# Patient Record
Sex: MALE | Race: BLACK OR AFRICAN AMERICAN | Employment: FULL TIME | URBAN - METROPOLITAN AREA
[De-identification: names, ages, dates, MRNs, and addresses within clinical notes are randomized per-mention and may not be internally consistent; named-entity substitution may affect disease eponyms.]

---

## 2017-06-12 ENCOUNTER — APPOINTMENT (OUTPATIENT)
Dept: CT IMAGING | Age: 42
DRG: 439 | End: 2017-06-12
Attending: EMERGENCY MEDICINE
Payer: SELF-PAY

## 2017-06-12 ENCOUNTER — APPOINTMENT (OUTPATIENT)
Dept: GENERAL RADIOLOGY | Age: 42
DRG: 439 | End: 2017-06-12
Attending: EMERGENCY MEDICINE
Payer: SELF-PAY

## 2017-06-12 ENCOUNTER — HOSPITAL ENCOUNTER (INPATIENT)
Age: 42
LOS: 2 days | Discharge: LEFT BEFORE DISCHARGE | DRG: 439 | End: 2017-06-14
Attending: EMERGENCY MEDICINE | Admitting: INTERNAL MEDICINE
Payer: SELF-PAY

## 2017-06-12 DIAGNOSIS — R11.2 NON-INTRACTABLE VOMITING WITH NAUSEA, UNSPECIFIED VOMITING TYPE: ICD-10-CM

## 2017-06-12 DIAGNOSIS — K85.90 ACUTE PANCREATITIS, UNSPECIFIED COMPLICATION STATUS, UNSPECIFIED PANCREATITIS TYPE: Primary | ICD-10-CM

## 2017-06-12 DIAGNOSIS — E86.0 DEHYDRATION: ICD-10-CM

## 2017-06-12 DIAGNOSIS — Z78.9 ALCOHOL USE: ICD-10-CM

## 2017-06-12 PROBLEM — N17.9 AKI (ACUTE KIDNEY INJURY) (HCC): Status: ACTIVE | Noted: 2017-06-12

## 2017-06-12 PROBLEM — R00.0 TACHYCARDIA: Status: ACTIVE | Noted: 2017-06-12

## 2017-06-12 PROBLEM — F10.10 ALCOHOL ABUSE: Status: ACTIVE | Noted: 2017-06-12

## 2017-06-12 PROBLEM — D72.829 LEUKOCYTOSIS: Status: ACTIVE | Noted: 2017-06-12

## 2017-06-12 PROBLEM — K86.3 PANCREATIC PSEUDOCYST: Status: ACTIVE | Noted: 2017-06-12

## 2017-06-12 LAB
ALBUMIN SERPL BCP-MCNC: 4 G/DL (ref 3.4–5)
ALBUMIN/GLOB SERPL: 0.9 {RATIO} (ref 0.8–1.7)
ALP SERPL-CCNC: 47 U/L (ref 45–117)
ALT SERPL-CCNC: 48 U/L (ref 16–61)
ANION GAP BLD CALC-SCNC: 12 MMOL/L (ref 3–18)
APPEARANCE UR: CLEAR
AST SERPL W P-5'-P-CCNC: 40 U/L (ref 15–37)
ATRIAL RATE: 152 BPM
BACTERIA URNS QL MICRO: ABNORMAL /HPF
BASOPHILS # BLD AUTO: 0 K/UL (ref 0–0.06)
BASOPHILS # BLD: 0 % (ref 0–2)
BILIRUB SERPL-MCNC: 2 MG/DL (ref 0.2–1)
BILIRUB UR QL: ABNORMAL
BUN SERPL-MCNC: 8 MG/DL (ref 7–18)
BUN/CREAT SERPL: 5 (ref 12–20)
CALCIUM SERPL-MCNC: 9.8 MG/DL (ref 8.5–10.1)
CALCULATED P AXIS, ECG09: 38 DEGREES
CALCULATED R AXIS, ECG10: 9 DEGREES
CALCULATED T AXIS, ECG11: 31 DEGREES
CHLORIDE SERPL-SCNC: 98 MMOL/L (ref 100–108)
CHOLEST SERPL-MCNC: 192 MG/DL
CO2 SERPL-SCNC: 23 MMOL/L (ref 21–32)
COLOR UR: ABNORMAL
CREAT SERPL-MCNC: 1.46 MG/DL (ref 0.6–1.3)
DIAGNOSIS, 93000: NORMAL
DIFFERENTIAL METHOD BLD: ABNORMAL
EOSINOPHIL # BLD: 0 K/UL (ref 0–0.4)
EOSINOPHIL NFR BLD: 0 % (ref 0–5)
EPITH CASTS URNS QL MICRO: ABNORMAL /LPF (ref 0–5)
ERYTHROCYTE [DISTWIDTH] IN BLOOD BY AUTOMATED COUNT: 13.9 % (ref 11.6–14.5)
GLOBULIN SER CALC-MCNC: 4.7 G/DL (ref 2–4)
GLUCOSE SERPL-MCNC: 110 MG/DL (ref 74–99)
GLUCOSE UR STRIP.AUTO-MCNC: NEGATIVE MG/DL
HCT VFR BLD AUTO: 48.6 % (ref 36–48)
HDLC SERPL-MCNC: 48 MG/DL (ref 40–60)
HDLC SERPL: 4 {RATIO} (ref 0–5)
HGB BLD-MCNC: 16.9 G/DL (ref 13–16)
HGB UR QL STRIP: ABNORMAL
KETONES UR QL STRIP.AUTO: ABNORMAL MG/DL
LDLC SERPL CALC-MCNC: 87.8 MG/DL (ref 0–100)
LEUKOCYTE ESTERASE UR QL STRIP.AUTO: ABNORMAL
LIPASE SERPL-CCNC: 905 U/L (ref 73–393)
LIPID PROFILE,FLP: ABNORMAL
LYMPHOCYTES # BLD AUTO: 7 % (ref 21–52)
LYMPHOCYTES # BLD: 1.1 K/UL (ref 0.9–3.6)
MCH RBC QN AUTO: 33.3 PG (ref 24–34)
MCHC RBC AUTO-ENTMCNC: 34.8 G/DL (ref 31–37)
MCV RBC AUTO: 95.9 FL (ref 74–97)
MIXED CELL CASTS URNS QL MICRO: ABNORMAL /LPF
MONOCYTES # BLD: 0.8 K/UL (ref 0.05–1.2)
MONOCYTES NFR BLD AUTO: 5 % (ref 3–10)
NEUTS SEG # BLD: 14.1 K/UL (ref 1.8–8)
NEUTS SEG NFR BLD AUTO: 88 % (ref 40–73)
NITRITE UR QL STRIP.AUTO: POSITIVE
P-R INTERVAL, ECG05: 122 MS
PH UR STRIP: 5.5 [PH] (ref 5–8)
PLATELET # BLD AUTO: 226 K/UL (ref 135–420)
PMV BLD AUTO: 10.9 FL (ref 9.2–11.8)
POTASSIUM SERPL-SCNC: 3.8 MMOL/L (ref 3.5–5.5)
PROT SERPL-MCNC: 8.7 G/DL (ref 6.4–8.2)
PROT UR STRIP-MCNC: 300 MG/DL
Q-T INTERVAL, ECG07: 328 MS
QRS DURATION, ECG06: 70 MS
QTC CALCULATION (BEZET), ECG08: 521 MS
RBC # BLD AUTO: 5.07 M/UL (ref 4.7–5.5)
RBC #/AREA URNS HPF: ABNORMAL /HPF (ref 0–5)
SODIUM SERPL-SCNC: 133 MMOL/L (ref 136–145)
SP GR UR REFRACTOMETRY: >1.03 (ref 1–1.03)
TRIGL SERPL-MCNC: 281 MG/DL (ref ?–150)
UROBILINOGEN UR QL STRIP.AUTO: 1 EU/DL (ref 0.2–1)
VENTRICULAR RATE, ECG03: 152 BPM
VLDLC SERPL CALC-MCNC: 56.2 MG/DL
WBC # BLD AUTO: 16.1 K/UL (ref 4.6–13.2)
WBC URNS QL MICRO: ABNORMAL /HPF (ref 0–4)

## 2017-06-12 PROCEDURE — 74011000250 HC RX REV CODE- 250: Performed by: EMERGENCY MEDICINE

## 2017-06-12 PROCEDURE — C9113 INJ PANTOPRAZOLE SODIUM, VIA: HCPCS | Performed by: EMERGENCY MEDICINE

## 2017-06-12 PROCEDURE — 87040 BLOOD CULTURE FOR BACTERIA: CPT | Performed by: HOSPITALIST

## 2017-06-12 PROCEDURE — 80061 LIPID PANEL: CPT | Performed by: INTERNAL MEDICINE

## 2017-06-12 PROCEDURE — 83605 ASSAY OF LACTIC ACID: CPT | Performed by: INTERNAL MEDICINE

## 2017-06-12 PROCEDURE — 74011250637 HC RX REV CODE- 250/637: Performed by: HOSPITALIST

## 2017-06-12 PROCEDURE — 74011250636 HC RX REV CODE- 250/636: Performed by: INTERNAL MEDICINE

## 2017-06-12 PROCEDURE — 96361 HYDRATE IV INFUSION ADD-ON: CPT

## 2017-06-12 PROCEDURE — 81001 URINALYSIS AUTO W/SCOPE: CPT | Performed by: EMERGENCY MEDICINE

## 2017-06-12 PROCEDURE — 96375 TX/PRO/DX INJ NEW DRUG ADDON: CPT

## 2017-06-12 PROCEDURE — 96374 THER/PROPH/DIAG INJ IV PUSH: CPT

## 2017-06-12 PROCEDURE — 74011636320 HC RX REV CODE- 636/320: Performed by: EMERGENCY MEDICINE

## 2017-06-12 PROCEDURE — 85025 COMPLETE CBC W/AUTO DIFF WBC: CPT | Performed by: EMERGENCY MEDICINE

## 2017-06-12 PROCEDURE — 83690 ASSAY OF LIPASE: CPT | Performed by: EMERGENCY MEDICINE

## 2017-06-12 PROCEDURE — 74011250636 HC RX REV CODE- 250/636: Performed by: HOSPITALIST

## 2017-06-12 PROCEDURE — 74011250636 HC RX REV CODE- 250/636: Performed by: EMERGENCY MEDICINE

## 2017-06-12 PROCEDURE — 65660000000 HC RM CCU STEPDOWN

## 2017-06-12 PROCEDURE — 36415 COLL VENOUS BLD VENIPUNCTURE: CPT | Performed by: INTERNAL MEDICINE

## 2017-06-12 PROCEDURE — 99285 EMERGENCY DEPT VISIT HI MDM: CPT

## 2017-06-12 PROCEDURE — 93005 ELECTROCARDIOGRAM TRACING: CPT

## 2017-06-12 PROCEDURE — 77030020263 HC SOL INJ SOD CL0.9% LFCR 1000ML

## 2017-06-12 PROCEDURE — 74177 CT ABD & PELVIS W/CONTRAST: CPT

## 2017-06-12 PROCEDURE — 80053 COMPREHEN METABOLIC PANEL: CPT | Performed by: EMERGENCY MEDICINE

## 2017-06-12 PROCEDURE — 87086 URINE CULTURE/COLONY COUNT: CPT | Performed by: HOSPITALIST

## 2017-06-12 PROCEDURE — 71010 XR CHEST SNGL V: CPT

## 2017-06-12 RX ORDER — ACETAMINOPHEN 325 MG/1
650 TABLET ORAL
Status: DISCONTINUED | OUTPATIENT
Start: 2017-06-12 | End: 2017-06-14 | Stop reason: HOSPADM

## 2017-06-12 RX ORDER — SODIUM CHLORIDE 0.9 % (FLUSH) 0.9 %
5-10 SYRINGE (ML) INJECTION AS NEEDED
Status: DISCONTINUED | OUTPATIENT
Start: 2017-06-12 | End: 2017-06-13

## 2017-06-12 RX ORDER — FAMOTIDINE 10 MG/ML
20 INJECTION INTRAVENOUS
Status: COMPLETED | OUTPATIENT
Start: 2017-06-12 | End: 2017-06-12

## 2017-06-12 RX ORDER — SODIUM CHLORIDE 9 MG/ML
250 INJECTION, SOLUTION INTRAVENOUS ONCE
Status: COMPLETED | OUTPATIENT
Start: 2017-06-13 | End: 2017-06-13

## 2017-06-12 RX ORDER — ONDANSETRON 2 MG/ML
4 INJECTION INTRAMUSCULAR; INTRAVENOUS
Status: COMPLETED | OUTPATIENT
Start: 2017-06-12 | End: 2017-06-12

## 2017-06-12 RX ORDER — PANTOPRAZOLE SODIUM 40 MG/10ML
80 INJECTION, POWDER, LYOPHILIZED, FOR SOLUTION INTRAVENOUS
Status: COMPLETED | OUTPATIENT
Start: 2017-06-12 | End: 2017-06-12

## 2017-06-12 RX ORDER — SODIUM CHLORIDE 9 MG/ML
100 INJECTION, SOLUTION INTRAVENOUS CONTINUOUS
Status: DISCONTINUED | OUTPATIENT
Start: 2017-06-12 | End: 2017-06-14 | Stop reason: HOSPADM

## 2017-06-12 RX ORDER — MORPHINE SULFATE 4 MG/ML
4 INJECTION, SOLUTION INTRAMUSCULAR; INTRAVENOUS
Status: COMPLETED | OUTPATIENT
Start: 2017-06-12 | End: 2017-06-12

## 2017-06-12 RX ORDER — ENOXAPARIN SODIUM 100 MG/ML
40 INJECTION SUBCUTANEOUS EVERY 24 HOURS
Status: DISCONTINUED | OUTPATIENT
Start: 2017-06-12 | End: 2017-06-14 | Stop reason: HOSPADM

## 2017-06-12 RX ORDER — HYDROMORPHONE HYDROCHLORIDE 1 MG/ML
1 INJECTION, SOLUTION INTRAMUSCULAR; INTRAVENOUS; SUBCUTANEOUS
Status: DISCONTINUED | OUTPATIENT
Start: 2017-06-12 | End: 2017-06-14 | Stop reason: HOSPADM

## 2017-06-12 RX ORDER — HYDROMORPHONE HYDROCHLORIDE 2 MG/ML
1 INJECTION, SOLUTION INTRAMUSCULAR; INTRAVENOUS; SUBCUTANEOUS
Status: DISCONTINUED | OUTPATIENT
Start: 2017-06-12 | End: 2017-06-12

## 2017-06-12 RX ORDER — ONDANSETRON 2 MG/ML
4 INJECTION INTRAMUSCULAR; INTRAVENOUS
Status: DISCONTINUED | OUTPATIENT
Start: 2017-06-12 | End: 2017-06-14 | Stop reason: HOSPADM

## 2017-06-12 RX ORDER — SODIUM CHLORIDE 0.9 % (FLUSH) 0.9 %
5-10 SYRINGE (ML) INJECTION EVERY 8 HOURS
Status: DISCONTINUED | OUTPATIENT
Start: 2017-06-12 | End: 2017-06-13

## 2017-06-12 RX ADMIN — Medication 4 MG: at 11:44

## 2017-06-12 RX ADMIN — Medication 10 ML: at 21:13

## 2017-06-12 RX ADMIN — SODIUM CHLORIDE 1000 ML: 900 INJECTION, SOLUTION INTRAVENOUS at 10:07

## 2017-06-12 RX ADMIN — ENOXAPARIN SODIUM 40 MG: 40 INJECTION SUBCUTANEOUS at 17:11

## 2017-06-12 RX ADMIN — PANTOPRAZOLE SODIUM 80 MG: 40 INJECTION, POWDER, FOR SOLUTION INTRAVENOUS at 11:44

## 2017-06-12 RX ADMIN — SODIUM CHLORIDE 250 ML: 900 INJECTION, SOLUTION INTRAVENOUS at 23:12

## 2017-06-12 RX ADMIN — ONDANSETRON 4 MG: 2 INJECTION INTRAMUSCULAR; INTRAVENOUS at 11:44

## 2017-06-12 RX ADMIN — SODIUM CHLORIDE 1000 ML: 900 INJECTION, SOLUTION INTRAVENOUS at 10:59

## 2017-06-12 RX ADMIN — Medication 10 ML: at 17:00

## 2017-06-12 RX ADMIN — ACETAMINOPHEN 650 MG: 325 TABLET, FILM COATED ORAL at 23:11

## 2017-06-12 RX ADMIN — SODIUM CHLORIDE 100 ML/HR: 900 INJECTION, SOLUTION INTRAVENOUS at 23:31

## 2017-06-12 RX ADMIN — HYDROMORPHONE HYDROCHLORIDE 1 MG: 1 INJECTION, SOLUTION INTRAMUSCULAR; INTRAVENOUS; SUBCUTANEOUS at 17:14

## 2017-06-12 RX ADMIN — SODIUM CHLORIDE 100 ML/HR: 900 INJECTION, SOLUTION INTRAVENOUS at 17:00

## 2017-06-12 RX ADMIN — IOPAMIDOL 99 ML: 612 INJECTION, SOLUTION INTRAVENOUS at 11:01

## 2017-06-12 RX ADMIN — HYDROMORPHONE HYDROCHLORIDE 1 MG: 1 INJECTION, SOLUTION INTRAMUSCULAR; INTRAVENOUS; SUBCUTANEOUS at 21:13

## 2017-06-12 RX ADMIN — FAMOTIDINE 20 MG: 10 INJECTION, SOLUTION INTRAVENOUS at 12:14

## 2017-06-12 NOTE — ED TRIAGE NOTES
Onset of abdominal pain Sat, vomiting, able to keep liquids down, \"early this AM chest pain\", diaphoretic

## 2017-06-12 NOTE — PROGRESS NOTES
conducted an initial consultation and Spiritual Assessment for Raomnita, who is a 37 y.o.,male. Patients Primary Language is: [place]. According to the patients EMR Buddhism Affiliation is: No Gnosticist. The reason the Patient came to the hospital is:   Patient Active Problem List    Diagnosis Date Noted    Acute pancreatitis 06/12/2017    Pancreatic pseudocyst 06/12/2017    Leukocytosis 06/12/2017    Tachycardia 06/12/2017    Dehydration 06/12/2017    DEMETRIA (acute kidney injury) ([facility] [address] 06/12/2017    Alcohol abuse 06/12/2017        The  provided the following Interventions:  Initiated a relationship of care and support. Explored issues of vaishnavi, belief, spirituality and Bahai/ritual needs while hospitalized. Listened empathically. Provided information about Spiritual Care Services. Offered prayer and assurance of continued prayers on patient's behalf. Chart reviewed. The following outcomes were achieved:  Patient shared limited information about both their medical narrative and spiritual journey/beliefs. Patient processed feeling about current hospitalization. Patient expressed gratitude for 's visit. Assessment:  Patient does not have any Bahai/cultural needs that will affect patients preferences in health care. There are no further spiritual or Bahai issues which require intervention at this time. Plan:  Chaplains will continue to follow and will provide pastoral care on an as needed/requested basis.  recommends bedside caregivers page  on duty if patient shows signs of acute spiritual or emotional distress. Karine Cox M.Div.   [facility]  [phone]

## 2017-06-12 NOTE — PROGRESS NOTES
Pt. Care received. Resting comfortably in bed. A/O 4. Call light within reach and bed in lowest position and locked. Patient stable.

## 2017-06-12 NOTE — H&P
Hospitalist Admission Note    NAME:  Deneen Rodriguez   :   1975   MRN:   [id number]     Date/Time:  2017 5:24 PM    Subjective:     CHIEF COMPLAINT:    Chief Complaint   Patient presents with    Abdominal Pain    Vomiting       HISTORY OF PRESENT ILLNESS:     Aldo Mattson is a 37 y.o.  male with h/o alcohol abuse,came to the hospital because of abdominal pain,nausea,vomting. It is more epigastric pain but also involving mid-section of abdomen. Patient says that he ate some fish and was drinking alcohol the same day. The next he started having the abdominal pain,nausea and has vomited twice and thought that it was foods poisoning. He denies fever or chills. As his symptoms did not improve. he decided to come to the emergency room. In ER,lab showed lipase 905. TC abdomen c/w acute pancreatitis. He was started on pain medications and iv fluid. I was then called for admission. History reviewed. No pertinent past medical history. Social History   Substance Use Topics    Smoking status: Current Some Day Smoker     Packs/day: 0.50    Smokeless tobacco: Never Used    Alcohol use 0.0 oz/week     0 Standard drinks or equivalent per week        History reviewed. No pertinent family history. No Known Allergies     Prior to Admission medications    Medication Sig Start Date End Date Taking? Authorizing Provider   HYDROcodone-acetaminophen (NORCO) 5-325 mg per tablet Take 1-2 tablets PO every 4-6 hours as needed for pain control. If over the counter ibuprofen or acetaminophen was suggested, then only take the vicodin for pain not well controlled with the over the counter medication. 7/11/15   Lucila , DO       REVIEW OF SYSTEMS:    Constitutional:  No fever or weight loss  HEENT:  No headache or visual changes  Cardiovascular:  No chest pain, no palpitations. Respiratory:  No coughing, wheezing, or shortness of breath. GI:  Adominal pain. Nausea and vomiting.   No diarrhea  :  No hematuria or dysuria. No frequency, retention, urinary incontinence. Skin:  No rashes or moles  Neuro:  No seizures or syncope  Hematological:  No bruising or bleeding  Endocrine:  No diabetes or thyroid disease  Objective:   VITALS:       Visit Vitals    BP (!) 154/113 (BP 1 Location: Right arm, BP Patient Position: At rest;Sitting)    Pulse (!) 130    Temp 99.1 °F (37.3 °C)    Resp 20    Ht 5' 7\" (1.702 m)    Wt 95.3 kg (210 lb)    SpO2 96%    BMI 32.89 kg/m2       O2 Device: Room air    PHYSICAL EXAM:   General:    Lying in bed in no acute distress. HEENT:  Pupils equal.  Sclera anicteric. Conjunctiva pink. Mucous membranes                           moist  Neck:  Supple. Trachea midline. No accessory muscle use. No thyromegaly. No jugular venous distention  CV:                  Regular rate and rhythm. Lungs:   Clear to auscultation bilaterally. No Wheezing or Rhonchi. No rales. Normal to percussion  Abdomen:   Diffuse tenderness to palpation,no rebound or guarding. Extremities: No cyanosis. No edema. No clubbing  Neurologic: Alert and oriented X 3. Skin:                Warm and dry. No rashes.        LAB DATA REVIEWED:    Recent Results (from the past 24 hour(s))   EKG, 12 LEAD, INITIAL    Collection Time: 06/12/17  8:03 AM   Result Value Ref Range    Ventricular Rate 152 BPM    Atrial Rate 152 BPM    P-R Interval 122 ms    QRS Duration 70 ms    Q-T Interval 328 ms    QTC Calculation (Bezet) 521 ms    Calculated P Axis 38 degrees    Calculated R Axis 9 degrees    Calculated T Axis 31 degrees    Diagnosis       Sinus tachycardia  Minimal voltage criteria for LVH, may be normal variant  Borderline ECG  No previous ECGs available  Confirmed by Doreen Reyna ([id number]) on 6/12/2017 11:42:48 AM     CBC WITH AUTOMATED DIFF    Collection Time: 06/12/17  8:39 AM   Result Value Ref Range    WBC 16.1 (H) 4.6 - 13.2 K/uL    RBC 5.07 4.70 - 5.50 M/uL    HGB 16.9 (H) 13.0 - 16.0 g/dL    HCT 48.6 (H) 36.0 - 48.0 %    MCV 95.9 74.0 - 97.0 FL    MCH 33.3 24.0 - 34.0 PG    MCHC 34.8 31.0 - 37.0 g/dL    RDW 13.9 11.6 - 14.5 %    PLATELET 640 201 - 835 K/uL    MPV 10.9 9.2 - 11.8 FL    NEUTROPHILS 88 (H) 40 - 73 %    LYMPHOCYTES 7 (L) 21 - 52 %    MONOCYTES 5 3 - 10 %    EOSINOPHILS 0 0 - 5 %    BASOPHILS 0 0 - 2 %    ABS. NEUTROPHILS 14.1 (H) 1.8 - 8.0 K/UL    ABS. LYMPHOCYTES 1.1 0.9 - 3.6 K/UL    ABS. MONOCYTES 0.8 0.05 - 1.2 K/UL    ABS. EOSINOPHILS 0.0 0.0 - 0.4 K/UL    ABS. BASOPHILS 0.0 0.0 - 0.06 K/UL    DF AUTOMATED     LIPASE    Collection Time: 06/12/17  8:39 AM   Result Value Ref Range    Lipase 905 (H) 73 - 788 U/L   METABOLIC PANEL, COMPREHENSIVE    Collection Time: 06/12/17  8:39 AM   Result Value Ref Range    Sodium 133 (L) 136 - 145 mmol/L    Potassium 3.8 3.5 - 5.5 mmol/L    Chloride 98 (L) 100 - 108 mmol/L    CO2 23 21 - 32 mmol/L    Anion gap 12 3.0 - 18 mmol/L    Glucose 110 (H) 74 - 99 mg/dL    BUN 8 7.0 - 18 MG/DL    Creatinine 1.46 (H) 0.6 - 1.3 MG/DL    BUN/Creatinine ratio 5 (L) 12 - 20      GFR est AA >60 >60 ml/min/1.73m2    GFR est non-AA 53 (L) >60 ml/min/1.73m2    Calcium 9.8 8.5 - 10.1 MG/DL    Bilirubin, total 2.0 (H) 0.2 - 1.0 MG/DL    ALT (SGPT) 48 16 - 61 U/L    AST (SGOT) 40 (H) 15 - 37 U/L    Alk.  phosphatase 47 45 - 117 U/L    Protein, total 8.7 (H) 6.4 - 8.2 g/dL    Albumin 4.0 3.4 - 5.0 g/dL    Globulin 4.7 (H) 2.0 - 4.0 g/dL    A-G Ratio 0.9 0.8 - 1.7     URINALYSIS W/ RFLX MICROSCOPIC    Collection Time: 06/12/17 11:25 AM   Result Value Ref Range    Color ORANGE      Appearance CLEAR      Specific gravity >1.030 (H) 1.005 - 1.030    pH (UA) 5.5 5.0 - 8.0      Protein 300 (A) NEG mg/dL    Glucose NEGATIVE  NEG mg/dL    Ketone TRACE (A) NEG mg/dL    Bilirubin SMALL (A) NEG      Blood MODERATE (A) NEG      Urobilinogen 1.0 0.2 - 1.0 EU/dL    Nitrites POSITIVE (A) NEG      Leukocyte Esterase TRACE (A) NEG     URINE MICROSCOPIC ONLY    Collection Time: 06/12/17 11:25 AM   Result Value Ref Range    WBC 0 to 3 0 - 4 /hpf    RBC 4 to 10 0 - 5 /hpf    Epithelial cells FEW 0 - 5 /lpf    Bacteria 1+ (A) NEG /hpf    Mixed Cell Cast 0 to 3 NEG /LPF       Assessment/Plan:      Principal Problem:    Acute pancreatitis (6/12/2017)    Active Problems:    Pancreatic pseudocyst (6/12/2017)      Leukocytosis (6/12/2017)      Tachycardia (6/12/2017)      Dehydration (6/12/2017)      DEMETRIA (acute kidney injury) ([facility] [address] (6/12/2017)      Alcohol abuse (6/12/2017)      Hyponatremia  ___________________________________________________  PLAN:    1. Acute pancreatitis:    -IV fluid. Dialudid for pain and zofran for nausea. -Will repeat CT of abd. 2.Dehydration/DEMETRIA/Hyponatremia/Tachycardia    -On iv fluid. Will monitor kidney functions and electrolytes  3. Alcohol abuse   -CWA protocol   Thiamine,folate and MVI  4.Leukocytosis   -Will follow up.likely due to pancreatitis.   DVT prophylaxis:lovenox  Full code  ___________________________________________________  Admitting Physician: Byron Gonzalez MD

## 2017-06-12 NOTE — ED PROVIDER NOTES
HPI Comments: 8:11 AM Singh Cheung is a 37 y.o. male with no pertinent medical history who presents to the emergency department c/o abdominal pain onset 2 days ago. The patient explains he believes he ate undercooked fish on Friday, and notes drinking alcohol the same day. Pt states he woke up the next day with constant abd pain. Pt pain radiates to his chest, and is worse or better as he sits. Pt also c/o nausea and vomiting. Pt denies fever, chills, diarrhea, and drug usage. No other concerns at this time. PCP: None)    The history is provided by the patient. No past medical history on file. No past surgical history on file. No family history on file. Social History     Social History    Marital status: SINGLE     Spouse name: N/A    Number of children: N/A    Years of education: N/A     Occupational History    Not on file. Social History Main Topics    Smoking status: Current Some Day Smoker     Packs/day: 0.50    Smokeless tobacco: Never Used    Alcohol use No    Drug use: No    Sexual activity: Not on file     Other Topics Concern    Not on file     Social History Narrative    ** Merged History Encounter **              ALLERGIES: Review of patient's allergies indicates no known allergies. Review of Systems   Constitutional: Negative for chills, fatigue, fever and unexpected weight change. HENT: Negative for congestion and rhinorrhea. Respiratory: Negative for chest tightness and shortness of breath. Cardiovascular: Positive for chest pain. Negative for palpitations and leg swelling. Gastrointestinal: Positive for abdominal pain, nausea and vomiting. Negative for diarrhea. Genitourinary: Negative for dysuria. Musculoskeletal: Negative for back pain. Skin: Negative for rash. Neurological: Negative for dizziness and weakness. Psychiatric/Behavioral: The patient is not nervous/anxious.         Vitals:    06/12/17 1000 06/12/17 1015 06/12/17 1030 06/12/17 1045   BP: (!) 159/103 (!) 146/102 134/89 (!) 138/107   Pulse: (!) 122 (!) 122 (!) 130 (!) 130   Resp: 21 23 22 20   Temp:       SpO2: 97% 96% 96% 95%   Weight:       Height:                Physical Exam   Constitutional: He is oriented to person, place, and time. He appears well-developed and well-nourished. No distress. HENT:   Head: Normocephalic and atraumatic. Right Ear: External ear normal.   Left Ear: External ear normal.   Nose: Nose normal.   Dry mucus membranes   Eyes: Conjunctivae and EOM are normal. Pupils are equal, round, and reactive to light. No scleral icterus. Neck: Normal range of motion. Neck supple. No JVD present. No tracheal deviation present. No thyromegaly present. Cardiovascular: Regular rhythm, normal heart sounds and intact distal pulses. Tachycardia present. Exam reveals no gallop and no friction rub. No murmur heard. Pulmonary/Chest: Effort normal and breath sounds normal. He exhibits no tenderness. Abdominal: Soft. Bowel sounds are normal. He exhibits no distension. There is tenderness. There is no rebound and no guarding. L flank/epigastric/upper abd tenderness. Bilateral lower quadrant tenderness   Musculoskeletal: Normal range of motion. He exhibits no edema or tenderness. Lymphadenopathy:     He has no cervical adenopathy. Neurological: He is alert and oriented to person, place, and time. No cranial nerve deficit. Coordination normal.   No sensory loss, Gait normal, Motor 5/5   Skin: Skin is warm. He is diaphoretic. Psychiatric: He has a normal mood and affect. His behavior is normal. Judgment and thought content normal.   Nursing note and vitals reviewed.        MDM  Number of Diagnoses or Management Options  Acute pancreatitis, unspecified complication status, unspecified pancreatitis type:   Alcohol use:   Dehydration:   Non-intractable vomiting with nausea, unspecified vomiting type:   Diagnosis management comments: Pt with abd pain, vomiting, and tachycardia 2 days of symptoms. Concerned for gallbladder disease vs pancreatitis. Will CT for obstructive pathology vs hemorraghic pancreatitis. Critical Care  Total time providing critical care: 30-74 minutes    ED Course       Procedures      Vitals:  Patient Vitals for the past 12 hrs:   Temp Pulse Resp BP SpO2   06/12/17 1045 - (!) 130 20 (!) 138/107 95 %   06/12/17 1030 - (!) 130 22 134/89 96 %   06/12/17 1015 - (!) 122 23 (!) 146/102 96 %   06/12/17 1000 - (!) 122 21 (!) 159/103 97 %   06/12/17 0945 - (!) 122 23 (!) 152/99 97 %   06/12/17 0930 - (!) 124 24 (!) 145/103 96 %   06/12/17 0915 - (!) 120 23 (!) 154/100 96 %   06/12/17 0900 - (!) 122 21 (!) 151/104 97 %   06/12/17 0845 - (!) 127 21 (!) 185/91 98 %   06/12/17 0830 - (!) 131 - (!) 138/95 97 %   06/12/17 0818 98.9 °F (37.2 °C) (!) 134 - (!) 142/101 97 %   06/12/17 0803 - - - (!) 158/111 97 %   06/12/17 0758 98.5 °F (36.9 °C) (!) 154 18 (!) 145/109 97 %           EKG interpretation by ED Physician:  Sinus rhythm 152 bpm, normal axis, no flutter wave appreciated, LVH is present. Qt/QTc widen 5.21ms. X-Ray, CT or other radiology findings or impressions:  CT ABD PELV W CONT   Final Result   IMPRESSION:     Prominent phlegmon adjacent to the pancreatic tail with ill-defined cystic mass  most likely due to an acute pancreatitis with developing pseudocyst. Follow-up  studies would be helpful to exclude a developing abscess. XR CHEST SNGL V   Final Result   IMPRESSION:     Hazy bibasilar lung abnormality -- differential diagnosis includes predominantly  infection, aspiration, and atelectasis           Progress notes, Consult notes or additional Procedure notes:   11:37 AM Pt labs consistent with acute pancreatitis, CT scan consistent with acute pancreatitis. No findings of hemorraghic pancreatitis. Discussed with patient, agrees to admission. 11:41 AM Consult:  Discussed care with Dr. Collette Shelton agrees to managing patient.  Standard discussion; including history of patients chief complaint, available diagnostic results, and treatment course. Disposition:  Diagnosis:   1. Acute pancreatitis, unspecified complication status, unspecified pancreatitis type    2. Dehydration    3. Non-intractable vomiting with nausea, unspecified vomiting type    4. Alcohol use        Disposition: Admitted         [facility] for and in presence of Opal Ashby MD (06/12/17)      Physician Attestation  I personally preformed the services described in this documentation, reviewed and edited the documentation which was dictated to the scribe in my presence, and it accurately records my words and actions.      Opal Ashby MD (06/12/17)      Signed by: Gretel Arce, 06/12/17, 8:11 AM

## 2017-06-13 LAB
AMYLASE SERPL-CCNC: 79 U/L (ref 25–115)
ANION GAP BLD CALC-SCNC: 10 MMOL/L (ref 3–18)
BASOPHILS # BLD AUTO: 0 K/UL (ref 0–0.06)
BASOPHILS # BLD: 0 % (ref 0–2)
BUN SERPL-MCNC: 11 MG/DL (ref 7–18)
BUN/CREAT SERPL: 8 (ref 12–20)
CALCIUM SERPL-MCNC: 8.9 MG/DL (ref 8.5–10.1)
CHLORIDE SERPL-SCNC: 103 MMOL/L (ref 100–108)
CO2 SERPL-SCNC: 26 MMOL/L (ref 21–32)
CREAT SERPL-MCNC: 1.42 MG/DL (ref 0.6–1.3)
DIFFERENTIAL METHOD BLD: ABNORMAL
EOSINOPHIL # BLD: 0.1 K/UL (ref 0–0.4)
EOSINOPHIL NFR BLD: 0 % (ref 0–5)
ERYTHROCYTE [DISTWIDTH] IN BLOOD BY AUTOMATED COUNT: 14.5 % (ref 11.6–14.5)
GLUCOSE SERPL-MCNC: 64 MG/DL (ref 74–99)
HCT VFR BLD AUTO: 41.7 % (ref 36–48)
HGB BLD-MCNC: 13.9 G/DL (ref 13–16)
LACTATE SERPL-SCNC: 1.1 MMOL/L (ref 0.4–2)
LIPASE SERPL-CCNC: 504 U/L (ref 73–393)
LYMPHOCYTES # BLD AUTO: 15 % (ref 21–52)
LYMPHOCYTES # BLD: 1.9 K/UL (ref 0.9–3.6)
MCH RBC QN AUTO: 32.9 PG (ref 24–34)
MCHC RBC AUTO-ENTMCNC: 33.3 G/DL (ref 31–37)
MCV RBC AUTO: 98.6 FL (ref 74–97)
MONOCYTES # BLD: 0.9 K/UL (ref 0.05–1.2)
MONOCYTES NFR BLD AUTO: 7 % (ref 3–10)
NEUTS SEG # BLD: 9.8 K/UL (ref 1.8–8)
NEUTS SEG NFR BLD AUTO: 78 % (ref 40–73)
PLATELET # BLD AUTO: 202 K/UL (ref 135–420)
PMV BLD AUTO: 10.6 FL (ref 9.2–11.8)
POTASSIUM SERPL-SCNC: 4.2 MMOL/L (ref 3.5–5.5)
RBC # BLD AUTO: 4.23 M/UL (ref 4.7–5.5)
SODIUM SERPL-SCNC: 139 MMOL/L (ref 136–145)
WBC # BLD AUTO: 12.6 K/UL (ref 4.6–13.2)

## 2017-06-13 PROCEDURE — 85025 COMPLETE CBC W/AUTO DIFF WBC: CPT | Performed by: INTERNAL MEDICINE

## 2017-06-13 PROCEDURE — 74011250636 HC RX REV CODE- 250/636: Performed by: INTERNAL MEDICINE

## 2017-06-13 PROCEDURE — 65660000000 HC RM CCU STEPDOWN

## 2017-06-13 PROCEDURE — 80048 BASIC METABOLIC PNL TOTAL CA: CPT | Performed by: INTERNAL MEDICINE

## 2017-06-13 PROCEDURE — 77030020263 HC SOL INJ SOD CL0.9% LFCR 1000ML

## 2017-06-13 PROCEDURE — 74011250637 HC RX REV CODE- 250/637: Performed by: INTERNAL MEDICINE

## 2017-06-13 PROCEDURE — 83690 ASSAY OF LIPASE: CPT | Performed by: INTERNAL MEDICINE

## 2017-06-13 PROCEDURE — 74011250636 HC RX REV CODE- 250/636: Performed by: HOSPITALIST

## 2017-06-13 PROCEDURE — 74011250637 HC RX REV CODE- 250/637: Performed by: HOSPITALIST

## 2017-06-13 PROCEDURE — 74011000258 HC RX REV CODE- 258: Performed by: HOSPITALIST

## 2017-06-13 PROCEDURE — 36415 COLL VENOUS BLD VENIPUNCTURE: CPT | Performed by: INTERNAL MEDICINE

## 2017-06-13 PROCEDURE — 82150 ASSAY OF AMYLASE: CPT | Performed by: INTERNAL MEDICINE

## 2017-06-13 RX ORDER — THERA TABS 400 MCG
1 TAB ORAL DAILY
Status: DISCONTINUED | OUTPATIENT
Start: 2017-06-14 | End: 2017-06-14 | Stop reason: HOSPADM

## 2017-06-13 RX ORDER — FOLIC ACID 1 MG/1
1 TABLET ORAL DAILY
Status: DISCONTINUED | OUTPATIENT
Start: 2017-06-14 | End: 2017-06-14 | Stop reason: HOSPADM

## 2017-06-13 RX ORDER — ASPIRIN 325 MG/1
100 TABLET, FILM COATED ORAL DAILY
Status: DISCONTINUED | OUTPATIENT
Start: 2017-06-14 | End: 2017-06-14 | Stop reason: HOSPADM

## 2017-06-13 RX ORDER — IBUPROFEN 200 MG
CAPSULE ORAL AS NEEDED
COMMUNITY

## 2017-06-13 RX ORDER — NICOTINE 7MG/24HR
1 PATCH, TRANSDERMAL 24 HOURS TRANSDERMAL DAILY
Status: DISCONTINUED | OUTPATIENT
Start: 2017-06-13 | End: 2017-06-14 | Stop reason: HOSPADM

## 2017-06-13 RX ADMIN — ENOXAPARIN SODIUM 40 MG: 40 INJECTION SUBCUTANEOUS at 17:38

## 2017-06-13 RX ADMIN — HYDROMORPHONE HYDROCHLORIDE 1 MG: 1 INJECTION, SOLUTION INTRAMUSCULAR; INTRAVENOUS; SUBCUTANEOUS at 05:51

## 2017-06-13 RX ADMIN — Medication 10 ML: at 17:38

## 2017-06-13 RX ADMIN — ACETAMINOPHEN 650 MG: 325 TABLET, FILM COATED ORAL at 05:59

## 2017-06-13 RX ADMIN — CEFTRIAXONE 1 G: 1 INJECTION, POWDER, FOR SOLUTION INTRAMUSCULAR; INTRAVENOUS at 03:25

## 2017-06-13 RX ADMIN — SODIUM CHLORIDE 100 ML/HR: 900 INJECTION, SOLUTION INTRAVENOUS at 10:30

## 2017-06-13 RX ADMIN — HYDROMORPHONE HYDROCHLORIDE 1 MG: 1 INJECTION, SOLUTION INTRAMUSCULAR; INTRAVENOUS; SUBCUTANEOUS at 10:31

## 2017-06-13 RX ADMIN — HYDROMORPHONE HYDROCHLORIDE 1 MG: 1 INJECTION, SOLUTION INTRAMUSCULAR; INTRAVENOUS; SUBCUTANEOUS at 17:38

## 2017-06-13 RX ADMIN — HYDROMORPHONE HYDROCHLORIDE 1 MG: 1 INJECTION, SOLUTION INTRAMUSCULAR; INTRAVENOUS; SUBCUTANEOUS at 21:35

## 2017-06-13 RX ADMIN — Medication 10 ML: at 05:51

## 2017-06-13 RX ADMIN — HYDROMORPHONE HYDROCHLORIDE 1 MG: 1 INJECTION, SOLUTION INTRAMUSCULAR; INTRAVENOUS; SUBCUTANEOUS at 01:45

## 2017-06-13 RX ADMIN — SODIUM CHLORIDE 100 ML/HR: 900 INJECTION, SOLUTION INTRAVENOUS at 21:35

## 2017-06-13 RX ADMIN — ACETAMINOPHEN 650 MG: 325 TABLET, FILM COATED ORAL at 19:27

## 2017-06-13 NOTE — PROGRESS NOTES
Patient has designated ________________brother________ to participate in his/her discharge plan and to receive any needed information.      Name: Sofía Gooden   Address:  Phone number:[phone]

## 2017-06-13 NOTE — PROGRESS NOTES
Chart review for MEWS monitoring, pt seen, no distress noted, , temp 102, lactic acid ordered, tylenol ordered, Dr Chely Ferrari aware    INPATIENT SEPSIS SUMMARY    · Blood Cultures Drawn: NO  · Time Initial Lactic Drawn: 06/12/2017    2330        · Lactic Acid Result: 1.1  · Time Next Lactic Acid Due: N/A  · Antibiotics started within 3 hours of arrival? NO  · Target fluid bolus 30ml/kg initiated? NO    Vital signs:  Patient Vitals for the past 4 hrs:   Temp Pulse Resp BP SpO2   06/13/17 0009 99.2 °F (37.3 °C) (!) 126 18 142/84 93 %   06/12/17 2222 - (!) 129 - - -   06/12/17 2220 (!) 102 °F (38.9 °C) (!) 132 16 121/78 94 %   06/12/17 2217 (!) 101.6 °F (38.7 °C) - - - -       MAP (Monitor): 100    =monitored (data validate)  MAP (Calculated): 103    =calculated (manual entry)      Additional Labs:    WBC:    Lab Results   Component Value Date/Time    WBC 16.1 06/12/2017 08:39 AM     Bilirubin:  Lab Results   Component Value Date/Time    Bilirubin, total 2.0 06/12/2017 08:39 AM    Bilirubin SMALL 06/12/2017 11:25 AM     INR:  No results found for: INR, PTMR, PTP, PT1, PT2  Creatinine:  Lab Results   Component Value Date/Time    Creatinine 1.46 06/12/2017 08:39 AM     Platelet Count:    Lab Results   Component Value Date/Time    PLATELET 644 06/58/1805 08:39 AM     POC Lactic Acid: No results found for: LACPO      SEPSIS CRITERIA MET? YES    Sepsis=2 or more SIRS criteria + infection     +UA  SIRS Criteria:   Temperature < 96.8°F (36°C) or > 100.9°F (38.3°C)    Heart Rate > 90 beats per minute    Respiratory Rate > 20 beats per minute    WBC count > 12,000 or <4,000 or > 10% bands     SEVERE SEPSIS CRITERIA MET? NO   SIRS criteria Met? YES   Documented source of infection? YES   Evidence of Organ Dysfunction?  NO    Severe Sepsis = SIRS Criteria + Source of Infection + Organ Dysfunction  Organ Dysfunction is met when the patient has new onset any of the following:   SBP<90 or MAP<65 or decrease in SBP more than 40 points from baseline    Bilirubin>2    INR>1.5 or aPTT>60    Creatinine>2 or UO<0.5 ml/kg/hr for 2 hours    Platelet count < 581,276    Lactate >2    Acute Respiratory Failure (BiPap/CPAP/Ventilator)    SEPTIC SHOCK CRITERIA MET? NO   Severe Sepsis criteria met? NO   Initial Lactic Acid > 4? NO   Persistent hypotension after 30ml/kg fluid bolus completion?  NO    Septic Shock = Severe Sepsis + Any of the following   Initial Lactate > 4    Persistent hypotension defined as 2 consecutive systolic BP's less than 90 and/or MAP less 65 within the first hour after fluid bolus completion        0143-lactic acid 1.1, primary RN updated, will notify Dr Oriana Alicea for any further orders (blood or urine culture, antibiotics, fluids)

## 2017-06-13 NOTE — PROGRESS NOTES
Hospitalist Progress Note  Byron Gonzalez MD  Internal medicine/ Hospitalist    Daily Progress Note: 2017 6:44 PM      Interval history / Subjective:   Still having pain left side of abdomen. No vomiting. Current Facility-Administered Medications   Medication Dose Route Frequency    cefTRIAXone (ROCEPHIN) 1 g in 0.9% sodium chloride (MBP/ADV) 50 mL MBP  1 g IntraVENous Q24H    nicotine (NICODERM CQ) 7 mg/24 hr patch 1 Patch  1 Patch TransDERmal DAILY    sodium chloride (NS) flush 5-10 mL  5-10 mL IntraVENous Q8H    sodium chloride (NS) flush 5-10 mL  5-10 mL IntraVENous PRN    enoxaparin (LOVENOX) injection 40 mg  40 mg SubCUTAneous Q24H    0.9% sodium chloride infusion  100 mL/hr IntraVENous CONTINUOUS    ondansetron (ZOFRAN) injection 4 mg  4 mg IntraVENous Q6H PRN    HYDROmorphone (PF) (DILAUDID) injection 1 mg  1 mg IntraVENous Q4H PRN    acetaminophen (TYLENOL) tablet 650 mg  650 mg Oral Q4H PRN        Objective:     Visit Vitals    /77 (BP 1 Location: Left arm, BP Patient Position: At rest)    Pulse (!) 125    Temp 100.2 °F (37.9 °C)    Resp 16    Ht 5' 7\" (1.702 m)    Wt 95.3 kg (210 lb)    SpO2 96%    BMI 32.89 kg/m2      O2 Device: Room air    Temp (24hrs), Av.9 °F (37.7 °C), Min:98.4 °F (36.9 °C), Max:102 °F (38.9 °C)          1901 -  0700  In: 1200 [I.V.:1200]  Out: 650 [Urine:650]  P/E  NAD,sitting in bed comfortably  Heent:perrla,at/nc,mouth moist.  Lungs ctab  Heart s1s2 nl,no m/g  Abdm:soft,mild to moderate tenderness left quadrant ,bs present. Extr:no edema. Data Review    No results found for this or any previous visit (from the past 12 hour(s)).       Assessment/Plan:     Principal Problem:    Acute pancreatitis (2017)    Active Problems:    Pancreatic pseudocyst (2017)      Leukocytosis (2017)      Tachycardia (2017)      Dehydration (2017)      DEMETRIA (acute kidney injury) ([facility] [address] (2017)      Alcohol abuse (2017) UTI    Care Plan  1. Acute pancreatitis:  -IV fluid. Dialudid for pain and zofran for nausea. -Advance diet  -Will repeat CT of abd. 2.Dehydration/DEMETRIA/Hyponatremia/Tachycardia  -Improved  3. Alcohol abuse  -CWA protocol  Thiamine,folate and MVI  4.Leukocytosis/UTI  -On ceftriaxone.   DVT prophylaxis:lovenox  Full code  ___________________________________________________  Admitting Physician: Alexandre Reyna MD

## 2017-06-13 NOTE — PROGRESS NOTES
[place] [facility]/[address]   Discharge Planning/ Assessment    Reasons for Intervention: Spoke with pt, lives with his brother. Employed, no insurance. Independent  With adls  And amb. Not on  Any  Script meds. No pcp , referral to . Will need cab ride home. Planhome, pt  Declines any  Substance  Abuse info.     High Risk Criteria  [x] Yes  []No   Physician Referral  [] Yes  [x]No        Date    Nursing Referral  [] Yes  [x]No        Date    Patient/Family Request  [] Yes  [x]No        Date       Resources:    Medicare  [] Yes  []No   Medicaid  [] Yes  []No   No Resources  [x] Yes  []No   Private Insurance  [] Yes  []No    Name/Phone Number    Other  [] Yes  []No        (i.e. Workman's Comp)         Prior Services:    Prior Services  [] Yes  [x]No   Home Health  [] Yes  []No   [address]  [] Yes  []No        Number of [address]  [] Yes  []No       Meals on Wheels  [] Yes  []No   Office on Aging  [] Yes  []No   Transportation Services  [] Yes  []No   Nursing Home  [] Yes  []No        Nursing Home Name    [facility]  [] Yes  []No        [address] Name    Other       Information Source:      Information obtained from  [x] Patient  [] Parent   [] Khadra Herrera  [] Child  [] Spouse   [] Significant Other/Partner   [] Friend      [] EMS    [] Nursing Home Chart          [] Other:   Chart Review  [] Yes  []No     Family/Support System:    Patient lives with  [] Alone    [] Spouse   [] Significant Other  [] Children  [] Caretaker   [] Parent  [x] Sibling     [] Other       Other Support System:    Is the patient responsible for care of others  [] Yes  [x]No   Information of person caring for patient on  discharge    Managers financial affairs independently  [x] Yes  []No   If no, explain:      Status Prior to Admission:    Mental Status  [] Awake  [] Alert  [x] Oriented  [] Quiet/Calm [] Lethargic/Sedated   [] Disoriented  [] Restless/Anxious [] Combative   Personal Care  [] Dependent  [x] Independent Personal Care  [] Requires Assistance   Meal Preparation Ability  [x] Independent   [] Standby Assistance   [] Minimal Assistance   [] Moderate Assistance  [] Maximum Assistance     [] Total Assistance   Chores  [x] Independent with Chores   [] N/A Nursing Home Resident   [] Requires Assistance   Bowel/Bladder  [x] Continent  [] Catheter  [] Incontinent  [] Ostomy Self-Care    [] Urine Diversion Self-Care  [] Maximum Assistance     [] Total Assistance   Number of Persons needed for assistance    DME at home  [] [address], Genene Binet  [] [address] [facility], Straight   [] Commode    [] Bathroom/Grab Bars  [] Hospital Bed  [] Nebulizer  [] Oxygen           [] Raised Toilet Seat  [] Shower Chair  [] Side Rails for Bed   [] Tub Transfer Bench   [] Marcelo Barclay  [] Estevan Myrick, [place]      [] Other:   Vendor      Treatment Presently Receiving:    Current Treatments  [] Chemotherapy  [] Dialysis  [] Insulin  [] IVAB [] IVF   [] O2  [] PCA   [] PT   [] RT   [] Tube Feedings   [] Wound Care     Psychosocial Evaluation:    Verbalized Knowledge of Disease Process  [x] Patient  []Family   Coping with Disease Process  [] Patient  []Family   Requires Further Counseling Coping with Disease Process  [] Patient  []Family     Identified Projected Needs:    Home Health Aid  [] Yes  [x]No   Transportation  [] Yes  [x]No   Education  [] Yes  [x]No        Specific Education     Financial Counseling  [] Yes  [x]No   Inability to Care for Self/Will Require 24 hour care  [] Yes  [x]No   Pain Management  [] Yes  [x]No   Home Infusion Therapy  [] Yes  [x]No   Oxygen Therapy  [] Yes  [x]No   DME  [] Yes  [x]No   Long Term Care Placement  [] Yes  [x]No   Rehab  [] Yes  [x]No   Physical Therapy  [] Yes  [x]No   Needs Anticipated At This Time  [] Yes  [x]No     Intra-Hospital Referral:    [address]  [] Yes  [x]No     [x] Yes  []No   Patient Representative  [] Yes  [x]No   Staff for Teaching Needs  [] Yes  [x]No   Specialty Teaching Needs     Diabetic Educator  [] Yes  [x]No   Referral for Diabetic Educator Needed  [] Yes  [x]No  If Yes, place order for Nutritionist or Diabetic Consult     Tentative Discharge Plan:    Home with No Services  [x] Yes  []No   Home with [address]  [] Yes  []No        If Yes, specify type    [address]  [] Yes  []No        If Yes, specify type    Meals on Wheels  [] Yes  []No   Office of Aging  [] Yes  []No   NHP  [] Yes  []No   Return to the Nursing Home  [] Yes  []No   Rehab Therapy  [] Yes  []No   Acute Rehab  [] Yes  []No   Subacute Rehab  [] Yes  []No   Private Care  [] Yes  []No   Substance Abuse Referral  [] Yes  []No   Transportation  [] Yes  []No   Chore Service  [] Yes  []No   Inpatient Hospice  [] Yes  []No   OP RT  [] Yes  [] No   OP Hemo  [] Yes  [] No   OP PT  [] Yes  []No   Support Group  [] Yes  []No   Reach to Recovery  [] Yes  []No   OP Oncology Clinic  [] Yes  []No   Clinic Appointment  [] Yes  []No   DME  [] Yes  []No   Comments    Name of D/C Planner or  Given to Patient or Family Kenzie valentin rn cm    Phone Number [phone]        Extension    Date 6/13/17   Time    If you are discharged home, whom do you designate to participate in your discharge plan and receive any information needed?      Enter name of designee         Phone # of designee         Address of designee         Updated         Patient refused to designate any           individual

## 2017-06-13 NOTE — INTERDISCIPLINARY ROUNDS
IDR Summary      Patient: Yariel Gonsalves MRN: [id number]    Age: 37 y.o.  : 1975     Admit Diagnosis: Acute pancreatitis      Problems pertinent to hospital stay:     Consults:Case Management     Testing due for patient today?  NO    Nutrition plan:Yes     Mobility needs: Yes      Lines/Tubes:   IV: YES   Needed: YES  [place]: NO  Needed:NO  Central Line: NO Needed: NO      VTE Prophylaxis: Chemical          Care Management:  Discharge plan: Home    PCP: None    : YES  Financial concerns:No   Interventions:       LOS: 1 days     Expected days until discharge: tomorrow        Signed:     SANA Heredia  [address]  Hospitalist Division  Pager:  [phone]  Office:  [phone]

## 2017-06-13 NOTE — PROGRESS NOTES
Nutrition initial assessment/Plan of care      RECOMMENDATIONS:   1. Clear Liquids  2. Monitor weight and PO intake  3. RD to follow     GOALS:   1. PO intake meets >75% of protein/calorie needs by  6/20  2. Weight Maintenance (-1-2#) by  6/20          ASSESSMENT:   Per BMI of 32.9 wt is classified as Obese. Labs reviewed. Nutrition recommendations listed. RD to follow. Nutrition Diagnoses: Wt gain related to excessive energy intake as evidenced by 6# wt gain over the past year, BMI 32.9    Nutrition Risk:  [] High  [] Moderate [x]  Low    SUBJECTIVE/OBJECTIVE:     Visited pt, states he became very ill Sat. With N/V & intense pain. Clear liquid lunch was the 1st meal he has had since that time. Pt instructed on/ verbalized a good understanding of low fat diet  And agreed to resume exercising to get back in shape. Information Obtained from:    [x] Chart Review   [] Patient   [] Family/Caregiver   [] Nurse/Physician   [] Interdisciplinary Meeting/Rounds    Dx: pancreatitiis  Diet: clear Liquid  Medications: [x] Reviewed    Allergies: [x] Reviewed   Encounter Diagnoses     ICD-10-CM ICD-9-CM   1. Acute pancreatitis, unspecified complication status, unspecified pancreatitis type K85.90 577.0   2. Dehydration E86.0 276.51   3. Non-intractable vomiting with nausea, unspecified vomiting type R11.2 787.01   4. Alcohol use Z78.9 V49.89     History reviewed. No pertinent past medical history.    Labs:    Lab Results   Component Value Date/Time    Sodium 139 06/13/2017 04:00 AM    Potassium 4.2 06/13/2017 04:00 AM    Chloride 103 06/13/2017 04:00 AM    CO2 26 06/13/2017 04:00 AM    Anion gap 10 06/13/2017 04:00 AM    Glucose 64 06/13/2017 04:00 AM    BUN 11 06/13/2017 04:00 AM    Creatinine 1.42 06/13/2017 04:00 AM    Calcium 8.9 06/13/2017 04:00 AM    Albumin 4.0 06/12/2017 08:39 AM     Anthropometrics: BMI (calculated): 32.9  Last 3 Recorded Weights in this Encounter    06/12/17 0758   Weight: 95.3 kg (210 lb)      Ht Readings from Last 1 Encounters:   06/12/17 5' 7\" (1.702 m)     No data found. IBW: 148 lb %IBW: 142% UBW: 184 lb   [] Weight Loss [x] Weight Gain [] Weight Stable    Estimated Nutrition Needs: [x] MSJ  [x] Other:  Calories: 2180 kcal Based on:   [x] Actual BW    Protein:  76 g Based on:   [x] Actual BW    Fluid:       2180   ml Based on:   [x] Actual BW      [x] No Cultural, Jehovah's witness or ethnic dietary need identified.     [] Cultural, Jehovah's witness and ethnic food preferences identified and addressed     Wt Status:  [] Normal (18.6 - 24.9) [] Underweight (<18.5) [] Overweight (25 - 29.9) [x] Mild Obesity (30 - 34.9)  [] Moderate Obesity (35 - 39.9) [] Morbid Obesity (40+)   [] Moderate Malnutrition [] Severe Malnutrition in the context of :     Nutrition Problems Identified:   [] Suboptimal PO intake   [] Food Allergies  [] Difficulty chewing/swallowing/poor dentition  [] Constipation/Diarrhea   [x] Nausea/Vomiting   [] None  [] Other:     Plan:   [x] Therapeutic Diet  []  Obtained/adjusted food preferences/tolerances and/or snacks options   []  Supplements added   [] Occupational therapy following for feeding techniques  []  HS snack added   []  Modify diet texture   []  Modify diet for food allergies   [x]  Educate patient   []  Assist with menu selection   [x]  Monitor PO intake on meal rounds   [x]  Continue inpatient monitoring and intervention   []  Participated in discharge planning/Interdisciplinary rounds/Team meetings   []  Other:     Education Needs:   [] Not appropriate for teaching at this time due to:   [x] Identified and addressed    Nutrition Monitoring and Evaluation:  [x] Continue ongoing monitoring and intervention  [] Other    Luis Guadarrama  Pager: [phone]

## 2017-06-13 NOTE — PROGRESS NOTES
Assumed pt care. Received pt resting in bed, alert and oriented x 4. Stated his abdominal  pain is tolerable at this time rated pain as 5/10 and said that when he moves it goes up to a 10/10. No s/s of distress. Call bell within reach. 2231: patient has a fever temp 102. Pt is diaphoretic and HR is elevated 120's - 130's. Patient stated he feels ok. Dr. Stanley Rhodes paged. 2245: re-paged Dr. Stanley Rhodes. 2218> phlebotomist at bedside to draw lactic acid. 6-    0009: rechecked temp. = 99.2 F. Patient resting in bed, no s/s of distress. 0145: rechecked temp. = 98.7 F .     0153: paged Dr. Stanley Rhodes. [phone]: re-paged Dr. Stanley Rhodes. 0230: received a call back from Dr. Stanley Rhodes, informed her that pt's temp went down to 98.7, and lactic acid came back 1.1 asked her if she wants to order blood culture and urine culture for this pt and antibiotics. Received an order for blood culture, urine culture and rocephin 1 g every 24 hrs. Read back provided    0234: called phlebotomist, talked to [facility], notified her that this pt has an order for STAT blood culture, she stated that she already marta it earlier when she marta the lactic acid. This nurse verbalized understanding. 0745: Bedside and Verbal shift change report given to Mortimer Many (oncoming nurse) by Ney Mena   (offgoing nurse). Report included the following information SBAR, Kardex, Intake/Output and MAR.

## 2017-06-13 NOTE — PROGRESS NOTES
Received pt from [address] [place] [address]. Pt Aox4, Telemetry box #28, skin intact, pt resting in bed/bed in low position, wheels locked, call bell within reach. Pt shows no s/s of distress at this time. 1330-Per Dr. Dwayne Cuevas order pt clear liquid diet/adv.as tolerated can d/c pt if tolerates diet. 1540-Pt resting in bed/watching t.v. Pt shows no s/s of distress at this time. 1738-Pt complained of pain. PRN pain medicine given. Will continue to assess pt for pain.

## 2017-06-14 ENCOUNTER — APPOINTMENT (OUTPATIENT)
Dept: CT IMAGING | Age: 42
DRG: 439 | End: 2017-06-14
Attending: INTERNAL MEDICINE
Payer: SELF-PAY

## 2017-06-14 VITALS
HEART RATE: 118 BPM | OXYGEN SATURATION: 96 % | DIASTOLIC BLOOD PRESSURE: 91 MMHG | HEIGHT: 67 IN | WEIGHT: 210 LBS | BODY MASS INDEX: 32.96 KG/M2 | TEMPERATURE: 98.2 F | RESPIRATION RATE: 20 BRPM | SYSTOLIC BLOOD PRESSURE: 174 MMHG

## 2017-06-14 LAB
ANION GAP BLD CALC-SCNC: 10 MMOL/L (ref 3–18)
BACTERIA SPEC CULT: NORMAL
BUN SERPL-MCNC: 11 MG/DL (ref 7–18)
BUN/CREAT SERPL: 10 (ref 12–20)
CALCIUM SERPL-MCNC: 8.7 MG/DL (ref 8.5–10.1)
CHLORIDE SERPL-SCNC: 102 MMOL/L (ref 100–108)
CO2 SERPL-SCNC: 24 MMOL/L (ref 21–32)
CREAT SERPL-MCNC: 1.13 MG/DL (ref 0.6–1.3)
GLUCOSE SERPL-MCNC: 79 MG/DL (ref 74–99)
POTASSIUM SERPL-SCNC: 3.9 MMOL/L (ref 3.5–5.5)
SERVICE CMNT-IMP: NORMAL
SODIUM SERPL-SCNC: 136 MMOL/L (ref 136–145)

## 2017-06-14 PROCEDURE — 80048 BASIC METABOLIC PNL TOTAL CA: CPT | Performed by: INTERNAL MEDICINE

## 2017-06-14 PROCEDURE — 74011250636 HC RX REV CODE- 250/636: Performed by: HOSPITALIST

## 2017-06-14 PROCEDURE — 74177 CT ABD & PELVIS W/CONTRAST: CPT

## 2017-06-14 PROCEDURE — 77030020263 HC SOL INJ SOD CL0.9% LFCR 1000ML

## 2017-06-14 PROCEDURE — 74011250637 HC RX REV CODE- 250/637: Performed by: HOSPITALIST

## 2017-06-14 PROCEDURE — 74011250637 HC RX REV CODE- 250/637: Performed by: INTERNAL MEDICINE

## 2017-06-14 PROCEDURE — 74011636320 HC RX REV CODE- 636/320: Performed by: INTERNAL MEDICINE

## 2017-06-14 PROCEDURE — 74011000258 HC RX REV CODE- 258: Performed by: HOSPITALIST

## 2017-06-14 PROCEDURE — 74011250636 HC RX REV CODE- 250/636: Performed by: INTERNAL MEDICINE

## 2017-06-14 PROCEDURE — 36415 COLL VENOUS BLD VENIPUNCTURE: CPT | Performed by: INTERNAL MEDICINE

## 2017-06-14 RX ADMIN — FOLIC ACID 1 MG: 1 TABLET ORAL at 10:15

## 2017-06-14 RX ADMIN — HYDROMORPHONE HYDROCHLORIDE 1 MG: 1 INJECTION, SOLUTION INTRAMUSCULAR; INTRAVENOUS; SUBCUTANEOUS at 05:33

## 2017-06-14 RX ADMIN — THIAMINE HCL TAB 100 MG 100 MG: 100 TAB at 10:15

## 2017-06-14 RX ADMIN — SODIUM CHLORIDE 100 ML/HR: 900 INJECTION, SOLUTION INTRAVENOUS at 07:48

## 2017-06-14 RX ADMIN — ACETAMINOPHEN 650 MG: 325 TABLET, FILM COATED ORAL at 02:43

## 2017-06-14 RX ADMIN — CEFTRIAXONE 1 G: 1 INJECTION, POWDER, FOR SOLUTION INTRAMUSCULAR; INTRAVENOUS at 02:43

## 2017-06-14 RX ADMIN — IOPAMIDOL 100 ML: 612 INJECTION, SOLUTION INTRAVENOUS at 12:05

## 2017-06-14 RX ADMIN — THERA TABS 1 TABLET: TAB at 10:15

## 2017-06-14 RX ADMIN — IOHEXOL 50 ML: 240 INJECTION, SOLUTION INTRATHECAL; INTRAVASCULAR; INTRAVENOUS; ORAL at 11:47

## 2017-06-14 RX ADMIN — HYDROMORPHONE HYDROCHLORIDE 1 MG: 1 INJECTION, SOLUTION INTRAMUSCULAR; INTRAVENOUS; SUBCUTANEOUS at 01:26

## 2017-06-14 NOTE — DISCHARGE SUMMARY
Discharge Summary    Patient: Maurilio Wheatley               Sex: male          DOA: 6/12/2017         YOB: 1975      Age:  37 y.o.        LOS:  LOS: 2 days                Admit Date: 6/12/2017    Discharge Date: 6/14/2017    Admission Diagnoses: Acute pancreatitis    Discharge Diagnoses:    Problem List as of 6/14/2017  Date Reviewed: 6/12/2017          Codes Class Noted - Resolved    * (Principal)Acute pancreatitis ICD-10-CM: K85.90  ICD-9-CM: 643.6  6/12/2017 - Present        Pancreatic pseudocyst ICD-10-CM: K86.3  ICD-9-CM: 577.2  6/12/2017 - Present        Leukocytosis ICD-10-CM: D72.829  ICD-9-CM: 288.60  6/12/2017 - Present        Tachycardia ICD-10-CM: R00.0  ICD-9-CM: 785.0  6/12/2017 - Present        Dehydration ICD-10-CM: E86.0  ICD-9-CM: 276.51  6/12/2017 - Present        DEMETRIA (acute kidney injury) ([facility] [address] ICD-10-CM: N17.9  ICD-9-CM: 584.9  6/12/2017 - Present        Alcohol abuse ICD-10-CM: F10.10  ICD-9-CM: 305.00  6/12/2017 - Present              Discharge Medications:   Cannot display discharge medications since this patient is not currently admitted. Follow-up:patient left AMA    Discharge Condition:stable    Labs:  Labs: Results:       Chemistry Recent Labs      06/14/17   0528  06/13/17   0400  06/12/17   0839   GLU  79  64*  110*   NA  136  139  133*   K  3.9  4.2  3.8   CL  102  103  98*   CO2  24  26  23   BUN  11  11  8   CREA  1.13  1.42*  1.46*   CA  8.7  8.9  9.8   AGAP  10  10  12   BUCR  10*  8*  5*   AP   --    --   47   TP   --    --   8.7*   ALB   --    --   4.0   GLOB   --    --   4.7*   AGRAT   --    --   0.9      CBC w/Diff Recent Labs      06/13/17   0400  06/12/17   0839   WBC  12.6  16.1*   RBC  4.23*  5.07   HGB  13.9  16.9*   HCT  41.7  48.6*   PLT  202  226   GRANS  78*  88*   LYMPH  15*  7*   EOS  0  0      Cardiac Enzymes No results for input(s): CPK, CKND1, STEVEN in the last 72 hours.     No lab exists for component: CKRMB, TROIP   Coagulation No results for input(s): PTP, INR, APTT in the last 72 hours. No lab exists for component: INREXT    Lipid Panel Lab Results   Component Value Date/Time    Cholesterol, total 192 06/12/2017 05:42 PM    HDL Cholesterol 48 06/12/2017 05:42 PM    LDL, calculated 87.8 06/12/2017 05:42 PM    VLDL, calculated 56.2 06/12/2017 05:42 PM    Triglyceride 281 06/12/2017 05:42 PM    CHOL/HDL Ratio 4.0 06/12/2017 05:42 PM      BNP No results for input(s): BNPP in the last 72 hours. Liver Enzymes Recent Labs      06/12/17   0839   TP  8.7*   ALB  4.0   AP  47   SGOT  40*      Thyroid Studies No results found for: T4, T3U, TSH, TSHEXT       Imaging:  CT scan of head on 6/12/2017:  Prominent phlegmon adjacent to the pancreatic tail with ill-defined cystic mass  most likely due to an acute pancreatitis with developing pseudocyst. Follow-up  studies would be helpful to exclude a developing abscess.       Consults:   none    Treatment Team: Treatment Team: Gretel: Teodora Schwab; Utilization Review: Rachid Starr RN; Primary Nurse: Rosalind Smiley RN    Significant Diagnostic Studies:see [facility] [place] [facility] Course:  Chelo Henry is a 37 y.o. male with h/o alcohol abuse,came to the hospital because of abdominal pain,nausea,vomting. It was more epigastric pain but also involving mid-section of abdomen. Patient said that he ate some fish and was drinking alcohol the same day. The next he started having the abdominal pain,nausea and has vomited twice and thought that it was foods poisoning. He denied fever or chills. As his symptoms did not improve. he decided to come to the emergency room. In ER,lab showed lipase 905. CT abdomen c/w acute pancreatitis. He was started on pain medications and iv fluid. Pt has improved and lipase went down to 504. He has tolerated po intake well. Patient was noted to be febrile on 6/13/2017 and was on blood cx and urine cx negative. CT abdomen repeated today showed persistent pancreatitis and nonocclusive splenic vein thrombosis, likely chronic. Unfortunately patient left the hospital AMA. Time for discharge:25 minutes.     Bekah Knox MD  June 14, 2017

## 2017-06-14 NOTE — PROGRESS NOTES
Assumed pt care. Received pt sitting on the bed, pt is alert and oriented x 4. No s/s of distress. With visitor at bedside. Call bell within reach.

## 2017-06-14 NOTE — PROGRESS NOTES
This nurse called [facility] non-emergency line to inform them that Pt left AMA with IVs still in his arms. Officer Sid Kay stated that he would dispatch an officer to Pt home.

## 2017-06-14 NOTE — PROGRESS NOTES
Assumed patient care. Receive patient awake, alert, oriented X4. Respiration is even, unlabored. Patient denies any pain at this time. Not in acute distress.

## 2017-06-14 NOTE — PROGRESS NOTES
Bedside and Verbal shift change report given to John Call RN (oncoming nurse) by [place] RN  (offgoing nurse). Report included the following information SBAR, Kardex, MAR and Med Rec Status.

## 2017-06-19 LAB
BACTERIA SPEC CULT: NORMAL
SERVICE CMNT-IMP: NORMAL